# Patient Record
Sex: MALE
[De-identification: names, ages, dates, MRNs, and addresses within clinical notes are randomized per-mention and may not be internally consistent; named-entity substitution may affect disease eponyms.]

---

## 2018-02-23 ENCOUNTER — HOSPITAL ENCOUNTER (EMERGENCY)
Dept: HOSPITAL 90 - EDH | Age: 18
Discharge: HOME | End: 2018-02-23
Payer: SELF-PAY

## 2018-02-23 DIAGNOSIS — E86.0: Primary | ICD-10-CM

## 2018-02-23 DIAGNOSIS — Z88.8: ICD-10-CM

## 2018-02-23 DIAGNOSIS — J11.1: ICD-10-CM

## 2018-02-23 DIAGNOSIS — J45.909: ICD-10-CM

## 2018-02-23 LAB
AMPHETAMINES UR QL SCN: NEGATIVE
BARBITURATES UR QL SCN: NEGATIVE
BENZODIAZ UR QL SCN: NEGATIVE
BZE UR QL SCN: NEGATIVE
CANNABINOIDS UR QL SCN: NEGATIVE
KETONES UR STRIP-MCNC: >=80 MG/DL
OPIATES UR QL SCN: NEGATIVE
PCP UR QL SCN: NEGATIVE
PH UR STRIP: 6.5 [PH] (ref 5–8)
PROT UR QL STRIP: (no result)
RBC #/AREA URNS HPF: (no result) /HPF (ref 0–1)
SP GR UR STRIP: 1.02 (ref 1–1.03)
UROBILINOGEN UR STRIP-MCNC: 1 MG/DL (ref 0.2–1)
WBC #/AREA URNS HPF: (no result) /HPF (ref 0–1)

## 2018-02-23 PROCEDURE — 96374 THER/PROPH/DIAG INJ IV PUSH: CPT

## 2018-02-23 PROCEDURE — 81001 URINALYSIS AUTO W/SCOPE: CPT

## 2018-02-23 PROCEDURE — 71045 X-RAY EXAM CHEST 1 VIEW: CPT

## 2018-02-23 PROCEDURE — 99285 EMERGENCY DEPT VISIT HI MDM: CPT

## 2018-02-23 PROCEDURE — 96361 HYDRATE IV INFUSION ADD-ON: CPT

## 2018-02-23 PROCEDURE — 80305 DRUG TEST PRSMV DIR OPT OBS: CPT

## 2024-05-27 ENCOUNTER — HOSPITAL ENCOUNTER (EMERGENCY)
Facility: HOSPITAL | Age: 24
Discharge: HOME | End: 2024-05-27
Attending: EMERGENCY MEDICINE

## 2024-05-27 VITALS
RESPIRATION RATE: 16 BRPM | OXYGEN SATURATION: 100 % | DIASTOLIC BLOOD PRESSURE: 71 MMHG | WEIGHT: 195 LBS | SYSTOLIC BLOOD PRESSURE: 132 MMHG | TEMPERATURE: 98 F | HEART RATE: 73 BPM | BODY MASS INDEX: 30.61 KG/M2 | HEIGHT: 67 IN

## 2024-05-27 DIAGNOSIS — H16.133 FLASH BURN TO EYE, BILATERAL: Primary | ICD-10-CM

## 2024-05-27 PROCEDURE — 25000003 PHARM REV CODE 250: Mod: ER | Performed by: PHYSICIAN ASSISTANT

## 2024-05-27 PROCEDURE — 99284 EMERGENCY DEPT VISIT MOD MDM: CPT | Mod: ER

## 2024-05-27 RX ORDER — OXYCODONE AND ACETAMINOPHEN 5; 325 MG/1; MG/1
1 TABLET ORAL
Status: COMPLETED | OUTPATIENT
Start: 2024-05-27 | End: 2024-05-27

## 2024-05-27 RX ORDER — ERYTHROMYCIN 5 MG/G
OINTMENT OPHTHALMIC
Qty: 3.5 G | Refills: 0 | Status: SHIPPED | OUTPATIENT
Start: 2024-05-27

## 2024-05-27 RX ORDER — ERYTHROMYCIN 5 MG/G
OINTMENT OPHTHALMIC
Status: COMPLETED | OUTPATIENT
Start: 2024-05-27 | End: 2024-05-27

## 2024-05-27 RX ORDER — OXYCODONE AND ACETAMINOPHEN 5; 325 MG/1; MG/1
1 TABLET ORAL EVERY 6 HOURS PRN
Qty: 12 TABLET | Refills: 0 | Status: SHIPPED | OUTPATIENT
Start: 2024-05-27

## 2024-05-27 RX ORDER — IBUPROFEN 600 MG/1
600 TABLET ORAL EVERY 6 HOURS PRN
Qty: 20 TABLET | Refills: 0 | Status: SHIPPED | OUTPATIENT
Start: 2024-05-27

## 2024-05-27 RX ADMIN — OXYCODONE HYDROCHLORIDE AND ACETAMINOPHEN 1 TABLET: 5; 325 TABLET ORAL at 02:05

## 2024-05-27 RX ADMIN — FLUORESCEIN SODIUM 2 EACH: 1 STRIP OPHTHALMIC at 02:05

## 2024-05-27 RX ADMIN — ERYTHROMYCIN: 5 OINTMENT OPHTHALMIC at 02:05

## 2024-05-27 NOTE — ED NOTES
"APPEARANCE: Alert, oriented and in no acute distress.  HEENT: Burning and irritation to bilateral eyes. Seeing "splashes of white". Speaks without hoarseness.  CARDIAC: Normal rate and rhythm.    PERIPHERAL VASCULAR: peripheral pulses present. Normal cap refill. No edema. Warm to touch.    RESPIRATORY:Normal rate and effort. Respirations are equal and unlabored no obvious signs of distress.  GASTRO: soft, nondistended, nontender. Denies nausea, vomiting, or diarrhea.  : voids spontaneously and without difficulty.   MUSC: Full ROM. No obvious deformity. Ambulatory with a steady gait  SKIN: Skin is warm and dry, without discoloration. Mucous membranes moist.  NEURO: Pt is awake, alert, aware of environment. No neurologic deficits noted.  "
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
